# Patient Record
Sex: FEMALE | Race: BLACK OR AFRICAN AMERICAN | NOT HISPANIC OR LATINO | Employment: FULL TIME | URBAN - METROPOLITAN AREA
[De-identification: names, ages, dates, MRNs, and addresses within clinical notes are randomized per-mention and may not be internally consistent; named-entity substitution may affect disease eponyms.]

---

## 2022-10-24 ENCOUNTER — OFFICE VISIT (OUTPATIENT)
Dept: URGENT CARE | Facility: CLINIC | Age: 22
End: 2022-10-24
Payer: MEDICAID

## 2022-10-24 VITALS
HEIGHT: 64 IN | OXYGEN SATURATION: 100 % | RESPIRATION RATE: 20 BRPM | WEIGHT: 293 LBS | HEART RATE: 82 BPM | SYSTOLIC BLOOD PRESSURE: 150 MMHG | TEMPERATURE: 99 F | BODY MASS INDEX: 50.02 KG/M2 | DIASTOLIC BLOOD PRESSURE: 90 MMHG

## 2022-10-24 DIAGNOSIS — Z11.52 ENCOUNTER FOR SCREENING FOR COVID-19: Primary | ICD-10-CM

## 2022-10-24 DIAGNOSIS — J02.9 SORE THROAT: ICD-10-CM

## 2022-10-24 PROBLEM — Z98.890 S/P LAPAROSCOPIC SURGERY: Status: ACTIVE | Noted: 2020-10-03

## 2022-10-24 LAB
CTP QC/QA: YES
FLUAV AG UPPER RESP QL IA.RAPID: NOT DETECTED
FLUBV AG UPPER RESP QL IA.RAPID: NOT DETECTED
S PYO RRNA THROAT QL PROBE: NEGATIVE
SARS-COV-2 RNA RESP QL NAA+PROBE: NOT DETECTED

## 2022-10-24 PROCEDURE — 87081 CULTURE SCREEN ONLY: CPT | Performed by: NURSE PRACTITIONER

## 2022-10-24 PROCEDURE — 99204 OFFICE O/P NEW MOD 45 MIN: CPT | Mod: PBBFAC | Performed by: NURSE PRACTITIONER

## 2022-10-24 PROCEDURE — 0241U COVID/FLU A&B PCR: CPT | Performed by: NURSE PRACTITIONER

## 2022-10-24 PROCEDURE — 99213 PR OFFICE/OUTPT VISIT, EST, LEVL III, 20-29 MIN: ICD-10-PCS | Mod: S$PBB,,, | Performed by: NURSE PRACTITIONER

## 2022-10-24 PROCEDURE — 87880 STREP A ASSAY W/OPTIC: CPT | Mod: PBBFAC | Performed by: NURSE PRACTITIONER

## 2022-10-24 PROCEDURE — 99213 OFFICE O/P EST LOW 20 MIN: CPT | Mod: S$PBB,,, | Performed by: NURSE PRACTITIONER

## 2022-10-24 RX ORDER — DESOGESTREL/ETHINYL ESTRADIOL AND ETHINYL ESTRADIOL 21-5 (28)
1 KIT ORAL DAILY
COMMUNITY
Start: 2022-10-15

## 2022-10-24 NOTE — PATIENT INSTRUCTIONS
Awaiting strep reflex culture result. Hold abx for today.  Tylenol/Motrin/Chloraseptic.  Humidifier at bedside.  Flonase once daily for 14 days.  Increase fluids.  Ice/Popsicles.

## 2022-10-24 NOTE — LETTER
October 24, 2022      [facility]  [address] [address]  Phone: [phone]       Patient: Taniya Cowan   YOB: 2000  Date of Visit: 10/24/2022    To Whom It May Concern:    Luli Cowan  was at [facility] on 10/24/2022. The patient may return to work/school on 10/26/2022 with no restrictions. If you have any questions or concerns, or if I can be of further assistance, please do not hesitate to contact me.    Sincerely,    Mckay Escudero NP

## 2022-10-24 NOTE — PROGRESS NOTES
"Subjective:       Patient ID: Taniya Cowan is a 22 y.o. female.    Vitals:  height is 5' 4" (1.626 m) and weight is 148.1 kg (326 lb 6.4 oz) (abnormal). Her oral temperature is 98.5 °F (36.9 °C). Her blood pressure is 150/90 (abnormal) and her pulse is 82. Her respiration is 20 and oxygen saturation is 100%.     Chief Complaint: Sore Throat (Starting this morning)    HPI as stated in CC. Works at a . States children have flu and rsv currently.  ROS    Objective:      Physical Exam   Constitutional: She is oriented to person, place, and time.  Non-toxic appearance. She does not appear ill. No distress. obesity  HENT:   Mouth/Throat: Posterior oropharyngeal erythema present. No oropharyngeal exudate.   Pulmonary/Chest: Effort normal and breath sounds normal.   Abdominal: Normal appearance.   Musculoskeletal:      Cervical back: She exhibits tenderness.   Lymphadenopathy:     She has cervical adenopathy.   Neurological: She is alert and oriented to person, place, and time.   Skin: Skin is warm, dry and not diaphoretic.   Psychiatric: Her behavior is normal. Mood, judgment and thought content normal.   Vitals reviewed.      Assessment:       1. Encounter for screening for COVID-19    2. Sore throat          Results for orders placed or performed in visit on 10/24/22   POCT rapid strep A   Result Value Ref Range    Rapid Strep A Screen Negative Negative     Acceptable Yes          Plan:         Encounter for screening for COVID-19  -     COVID/FLU A&B PCR  -     COVID/RSV/FLU A&B PCR    Sore throat  -     POCT rapid strep A  -     Strep Only Culture  -     COVID/RSV/FLU A&B PCR     Awaiting strep reflex culture result. Hold abx for today.  Tylenol/Motrin/Chloraseptic.  Humidifier at bedside.  Flonase once daily for 14 days.  Increase fluids.  Ice/Popsicles.              "

## 2022-10-26 LAB — BACTERIA THROAT CULT: NORMAL

## 2025-04-14 ENCOUNTER — OFFICE VISIT (OUTPATIENT)
Dept: URGENT CARE | Facility: CLINIC | Age: 25
End: 2025-04-14
Payer: MEDICAID

## 2025-04-14 VITALS
BODY MASS INDEX: 48.82 KG/M2 | WEIGHT: 293 LBS | HEART RATE: 84 BPM | OXYGEN SATURATION: 100 % | DIASTOLIC BLOOD PRESSURE: 85 MMHG | RESPIRATION RATE: 18 BRPM | HEIGHT: 65 IN | SYSTOLIC BLOOD PRESSURE: 139 MMHG | TEMPERATURE: 99 F

## 2025-04-14 DIAGNOSIS — N89.8 VAGINAL ITCHING: Primary | ICD-10-CM

## 2025-04-14 LAB
CLUE CELLS VAG QL WET PREP: NORMAL
T VAGINALIS VAG QL WET PREP: NORMAL
WBC #/AREA VAG WET PREP: NORMAL
YEAST SPEC QL WET PREP: NORMAL

## 2025-04-14 PROCEDURE — 99214 OFFICE O/P EST MOD 30 MIN: CPT | Mod: PBBFAC

## 2025-04-14 PROCEDURE — 87591 N.GONORRHOEAE DNA AMP PROB: CPT

## 2025-04-14 PROCEDURE — 99213 OFFICE O/P EST LOW 20 MIN: CPT | Mod: S$PBB,,,

## 2025-04-14 PROCEDURE — 87210 SMEAR WET MOUNT SALINE/INK: CPT

## 2025-04-14 NOTE — PATIENT INSTRUCTIONS
Will notify patient of any positive results on testing and treat accordingly.  Patient can follow all test results on the patient portal.     Please practice safe sex and read patient education material.  Avoid sexual activity until all results are known. Follow up with PCP or return to urgent care if symptoms are not improving in several days. Go to the ER if symptoms worsen or new symptoms develop.

## 2025-04-14 NOTE — PROGRESS NOTES
"Subjective:       Patient ID: Taniya Cowan is a 24 y.o. female.    Vitals:  height is 5' 5" (1.651 m) and weight is 149.7 kg (330 lb) (abnormal). Her temperature is 98.7 °F (37.1 °C). Her blood pressure is 139/85 and her pulse is 84. Her respiration is 18 and oxygen saturation is 100%.     Chief Complaint: Vaginal Itching (X 3days)    24-year-old female reports to the clinic with complaints of vaginal itching and vaginal irritation that began 3 days ago.  Patient denies any vaginal discharge, vaginal odor, genital warts, genital lesions, genital rash.  Patient states she does have unprotected sex but is currently on birth control.  Patient denies fever, nausea vomiting, abdominal pain, flank, suprapubic pain    All other systems are negative    Chart reviewed    Objective:   Physical Exam   Constitutional: She appears well-developed.  Non-toxic appearance. She does not appear ill. No distress.   Neck: Neck supple.   Cardiovascular: Regular rhythm.   Pulmonary/Chest: Effort normal and breath sounds normal.   Abdominal: Normal appearance. She exhibits no distension. Soft. flat abdomen There is no abdominal tenderness. There is no rebound, no guarding, no left CVA tenderness and no right CVA tenderness.   Musculoskeletal: Normal range of motion.         General: Normal range of motion.   Neurological: no focal deficit. She is alert.   Skin: Skin is warm, dry and not diaphoretic.   Psychiatric: Her behavior is normal. Mood normal.   Nursing note and vitals reviewed.      Assessment:     1. Vaginal itching            Plan:       Will notify patient of any positive results on testing and treat accordingly.  Patient can follow all test results on the patient portal.     Please practice safe sex and read patient education material.  Avoid sexual activity until all results are known. Follow up with PCP or return to urgent care if symptoms are not improving in several days. Go to the ER if symptoms worsen or new symptoms " develop.     Vaginal itching  -     Wet Prep, Genital  -     Sexually-Transmitted Infections (STIs) Increased Risk Panel        Please note: This chart was completed via voice to text dictation. It may contain typographical/word recognition errors. If there are any questions, please contact the provider for final clarification.

## 2025-04-15 LAB
C TRACH RRNA UR QL NAA+PROBE: NOT DETECTED
N GONORRHOEA DNA UR QL NAA+PROBE: NOT DETECTED
T VAGINALIS RRNA UR QL NAA+PROBE: NOT DETECTED

## 2025-05-28 ENCOUNTER — HOSPITAL ENCOUNTER (EMERGENCY)
Facility: HOSPITAL | Age: 25
Discharge: HOME | End: 2025-05-28
Attending: EMERGENCY MEDICINE
Payer: MEDICAID

## 2025-05-28 VITALS
BODY MASS INDEX: 48.82 KG/M2 | HEIGHT: 65 IN | TEMPERATURE: 97 F | WEIGHT: 293 LBS | SYSTOLIC BLOOD PRESSURE: 128 MMHG | OXYGEN SATURATION: 96 % | DIASTOLIC BLOOD PRESSURE: 93 MMHG | HEART RATE: 64 BPM | RESPIRATION RATE: 20 BRPM

## 2025-05-28 DIAGNOSIS — K52.9 GASTROENTERITIS: Primary | ICD-10-CM

## 2025-05-28 LAB
ALBUMIN SERPL-MCNC: 3.6 G/DL (ref 3.5–5)
ALBUMIN/GLOB SERPL: 0.8 RATIO (ref 1.1–2)
ALP SERPL-CCNC: 48 UNIT/L (ref 40–150)
ALT SERPL-CCNC: 9 UNIT/L (ref 0–55)
ANION GAP SERPL CALC-SCNC: 6 MEQ/L
AST SERPL-CCNC: 7 UNIT/L (ref 11–45)
B-HCG UR QL: NEGATIVE
BACTERIA #/AREA URNS AUTO: ABNORMAL /HPF
BASOPHILS # BLD AUTO: 0.03 X10(3)/MCL
BASOPHILS NFR BLD AUTO: 0.3 %
BILIRUB SERPL-MCNC: 0.3 MG/DL
BILIRUB UR QL STRIP.AUTO: NEGATIVE
BUN SERPL-MCNC: 10.8 MG/DL (ref 7–18.7)
CALCIUM SERPL-MCNC: 9.5 MG/DL (ref 8.4–10.2)
CHLORIDE SERPL-SCNC: 108 MMOL/L (ref 98–107)
CLARITY UR: CLEAR
CO2 SERPL-SCNC: 25 MMOL/L (ref 22–29)
COLOR UR AUTO: ABNORMAL
CREAT SERPL-MCNC: 0.73 MG/DL (ref 0.55–1.02)
CREAT/UREA NIT SERPL: 15
CTP QC/QA: YES
EOSINOPHIL # BLD AUTO: 0.13 X10(3)/MCL (ref 0–0.9)
EOSINOPHIL NFR BLD AUTO: 1.5 %
ERYTHROCYTE [DISTWIDTH] IN BLOOD BY AUTOMATED COUNT: 11.9 % (ref 11.5–17)
GFR SERPLBLD CREATININE-BSD FMLA CKD-EPI: >60 ML/MIN/1.73/M2
GLOBULIN SER-MCNC: 4.3 GM/DL (ref 2.4–3.5)
GLUCOSE SERPL-MCNC: 102 MG/DL (ref 74–100)
GLUCOSE UR QL STRIP: NORMAL
HCT VFR BLD AUTO: 37.5 % (ref 37–47)
HGB BLD-MCNC: 12.4 G/DL (ref 12–16)
HGB UR QL STRIP: NEGATIVE
HOLD SPECIMEN: NORMAL
HYALINE CASTS #/AREA URNS LPF: ABNORMAL /LPF
IMM GRANULOCYTES # BLD AUTO: 0.01 X10(3)/MCL (ref 0–0.04)
IMM GRANULOCYTES NFR BLD AUTO: 0.1 %
KETONES UR QL STRIP: NEGATIVE
LEUKOCYTE ESTERASE UR QL STRIP: NEGATIVE
LIPASE SERPL-CCNC: 12 U/L
LYMPHOCYTES # BLD AUTO: 2.46 X10(3)/MCL (ref 0.6–4.6)
LYMPHOCYTES NFR BLD AUTO: 28.1 %
MCH RBC QN AUTO: 28.7 PG (ref 27–31)
MCHC RBC AUTO-ENTMCNC: 33.1 G/DL (ref 33–36)
MCV RBC AUTO: 86.8 FL (ref 80–94)
MONOCYTES # BLD AUTO: 0.38 X10(3)/MCL (ref 0.1–1.3)
MONOCYTES NFR BLD AUTO: 4.3 %
MUCOUS THREADS URNS QL MICRO: ABNORMAL /LPF
NEUTROPHILS # BLD AUTO: 5.76 X10(3)/MCL (ref 2.1–9.2)
NEUTROPHILS NFR BLD AUTO: 65.7 %
NITRITE UR QL STRIP: NEGATIVE
NRBC BLD AUTO-RTO: 0 %
PH UR STRIP: 6.5 [PH]
PLATELET # BLD AUTO: 283 X10(3)/MCL (ref 130–400)
PMV BLD AUTO: 10.4 FL (ref 7.4–10.4)
POTASSIUM SERPL-SCNC: 4 MMOL/L (ref 3.5–5.1)
PROT SERPL-MCNC: 7.9 GM/DL (ref 6.4–8.3)
PROT UR QL STRIP: NEGATIVE
RBC # BLD AUTO: 4.32 X10(6)/MCL (ref 4.2–5.4)
RBC #/AREA URNS AUTO: ABNORMAL /HPF
SODIUM SERPL-SCNC: 139 MMOL/L (ref 136–145)
SP GR UR STRIP.AUTO: 1.02 (ref 1–1.03)
SQUAMOUS #/AREA URNS LPF: ABNORMAL /HPF
UROBILINOGEN UR STRIP-ACNC: NORMAL
WBC # BLD AUTO: 8.77 X10(3)/MCL (ref 4.5–11.5)
WBC #/AREA URNS AUTO: ABNORMAL /HPF

## 2025-05-28 PROCEDURE — 81025 URINE PREGNANCY TEST: CPT | Performed by: PHYSICIAN ASSISTANT

## 2025-05-28 PROCEDURE — 83690 ASSAY OF LIPASE: CPT | Performed by: PHYSICIAN ASSISTANT

## 2025-05-28 PROCEDURE — 99284 EMERGENCY DEPT VISIT MOD MDM: CPT | Mod: 25

## 2025-05-28 PROCEDURE — 85025 COMPLETE CBC W/AUTO DIFF WBC: CPT | Performed by: PHYSICIAN ASSISTANT

## 2025-05-28 PROCEDURE — 25000003 PHARM REV CODE 250: Performed by: PHYSICIAN ASSISTANT

## 2025-05-28 PROCEDURE — 80053 COMPREHEN METABOLIC PANEL: CPT | Performed by: PHYSICIAN ASSISTANT

## 2025-05-28 PROCEDURE — 81001 URINALYSIS AUTO W/SCOPE: CPT | Performed by: PHYSICIAN ASSISTANT

## 2025-05-28 RX ORDER — LIDOCAINE HYDROCHLORIDE 20 MG/ML
15 SOLUTION OROPHARYNGEAL ONCE
Status: COMPLETED | OUTPATIENT
Start: 2025-05-28 | End: 2025-05-28

## 2025-05-28 RX ORDER — ALUMINUM HYDROXIDE, MAGNESIUM HYDROXIDE, AND SIMETHICONE 1200; 120; 1200 MG/30ML; MG/30ML; MG/30ML
30 SUSPENSION ORAL ONCE
Status: COMPLETED | OUTPATIENT
Start: 2025-05-28 | End: 2025-05-28

## 2025-05-28 RX ORDER — ONDANSETRON 4 MG/1
4 TABLET, ORALLY DISINTEGRATING ORAL
Status: COMPLETED | OUTPATIENT
Start: 2025-05-28 | End: 2025-05-28

## 2025-05-28 RX ORDER — PROMETHAZINE HYDROCHLORIDE 25 MG/1
25 TABLET ORAL EVERY 6 HOURS PRN
Qty: 15 TABLET | Refills: 0 | Status: SHIPPED | OUTPATIENT
Start: 2025-05-28

## 2025-05-28 RX ADMIN — LIDOCAINE HYDROCHLORIDE 15 ML: 20 SOLUTION ORAL at 10:05

## 2025-05-28 RX ADMIN — ALUMINUM HYDROXIDE, MAGNESIUM HYDROXIDE, AND SIMETHICONE 30 ML: 200; 200; 20 SUSPENSION ORAL at 10:05

## 2025-05-28 RX ADMIN — ONDANSETRON 4 MG: 4 TABLET, ORALLY DISINTEGRATING ORAL at 09:05

## 2025-05-28 NOTE — Clinical Note
"Le'Era "Le'Era" Camryn was seen and treated in our emergency department on 5/28/2025.  She may return to work on 05/30/2025.       If you have any questions or concerns, please don't hesitate to call.      Edith Newby PA-C"

## 2025-05-28 NOTE — ED PROVIDER NOTES
Encounter Date: 5/28/2025       History     Chief Complaint   Patient presents with    Abdominal Pain     Reports abdominal pain radiating to back with some nausea that began early this morning. Denies urinary and bowel issues. Pt. Ambulatory to triage.      Taniya Cowan is a 24 y.o. female with a PMHx of a benign heart murmur who presents to the ED with complaints of nausea, upper abdominal pain, and back pain that started this morning. She denies known sick contacts. She reports 1 episode of diarrhea. Denies dysuria, hematuria, fever, chills.       The history is provided by the patient. No  was used.     Review of patient's allergies indicates:   Allergen Reactions    Grass pollen-june grass standard      Past Medical History:   Diagnosis Date    Heart murmur     Morbid obesity      Past Surgical History:   Procedure Laterality Date    BUNIONECTOMY Left     FALLOPIAN TUBOPLASTY Left      Family History   Problem Relation Name Age of Onset    No Known Problems Mother      No Known Problems Father       Social History[1]  Review of Systems   Constitutional:  Negative for chills, fatigue and fever.   HENT:  Negative for congestion, ear pain, sinus pain and sore throat.    Eyes:  Negative for pain.   Respiratory:  Negative for cough, chest tightness and shortness of breath.    Cardiovascular:  Negative for chest pain.   Gastrointestinal:  Positive for abdominal pain and nausea. Negative for constipation, diarrhea and vomiting.   Genitourinary:  Negative for dysuria, flank pain and hematuria.   Musculoskeletal:  Negative for back pain and joint swelling.   Skin:  Negative for color change and rash.   Neurological:  Negative for dizziness, weakness and headaches.   Psychiatric/Behavioral:  Negative for behavioral problems and confusion.        Physical Exam     Initial Vitals [05/28/25 0900]   BP Pulse Resp Temp SpO2   115/89 62 20 97 °F (36.1 °C) 99 %      MAP       --         Physical  Exam    Nursing note and vitals reviewed.  Constitutional: She appears well-developed and well-nourished.   HENT:   Head: Normocephalic and atraumatic.   Nose: Nose normal.   Eyes: EOM are normal. Pupils are equal, round, and reactive to light.   Neck: Neck supple. No thyromegaly present. No JVD present.   Normal range of motion.  Cardiovascular:  Normal rate, regular rhythm, normal heart sounds and intact distal pulses.           No murmur heard.  Pulmonary/Chest: Breath sounds normal. No respiratory distress. She has no wheezes. She has no rhonchi. She has no rales. She exhibits no tenderness.   Abdominal: Abdomen is soft. Bowel sounds are normal. She exhibits no distension. There is no abdominal tenderness. There is no rebound and no guarding.   Musculoskeletal:         General: No tenderness or edema. Normal range of motion.      Cervical back: Normal range of motion and neck supple.     Lymphadenopathy:     She has no cervical adenopathy.   Neurological: She is alert and oriented to person, place, and time.   Skin: Skin is warm and dry. Capillary refill takes less than 2 seconds.   Psychiatric: She has a normal mood and affect. Thought content normal.         ED Course   Procedures  Labs Reviewed   URINALYSIS, REFLEX TO URINE CULTURE - Abnormal       Result Value    Color, UA Light-Yellow      Appearance, UA Clear      Specific Gravity, UA 1.025      pH, UA 6.5      Protein, UA Negative      Glucose, UA Normal      Ketones, UA Negative      Blood, UA Negative      Bilirubin, UA Negative      Urobilinogen, UA Normal      Nitrites, UA Negative      Leukocyte Esterase, UA Negative      RBC, UA 0-5      WBC, UA 0-5      Bacteria, UA None Seen      Squamous Epithelial Cells, UA Trace (*)     Mucous, UA Trace (*)     Hyaline Casts, UA None Seen     COMPREHENSIVE METABOLIC PANEL - Abnormal    Sodium 139      Potassium 4.0      Chloride 108 (*)     CO2 25      Glucose 102 (*)     Blood Urea Nitrogen 10.8       Creatinine 0.73      Calcium 9.5      Protein Total 7.9      Albumin 3.6      Globulin 4.3 (*)     Albumin/Globulin Ratio 0.8 (*)     Bilirubin Total 0.3      ALP 48      ALT 9      AST 7 (*)     eGFR >60      Anion Gap 6.0      BUN/Creatinine Ratio 15     LIPASE - Normal    Lipase Level 12     CBC W/ AUTO DIFFERENTIAL    Narrative:     The following orders were created for panel order CBC auto differential.  Procedure                               Abnormality         Status                     ---------                               -----------         ------                     CBC with Differential[[id number]]                           Final result                 Please view results for these tests on the individual orders.   CBC WITH DIFFERENTIAL    WBC 8.77      RBC 4.32      Hgb 12.4      Hct 37.5      MCV 86.8      MCH 28.7      MCHC 33.1      RDW 11.9      Platelet 283      MPV 10.4      Neut % 65.7      Lymph % 28.1      Mono % 4.3      Eos % 1.5      Basophil % 0.3      Imm Grans % 0.1      Neut # 5.76      Lymph # 2.46      Mono # 0.38      Eos # 0.13      Baso # 0.03      Imm Gran # 0.01      NRBC% 0.0     EXTRA TUBES    Narrative:     The following orders were created for panel order EXTRA TUBES.  Procedure                               Abnormality         Status                     ---------                               -----------         ------                     Light Blue Top Hold[[id number]]                             Final result               Gold Top Hold[[id number]]                                   Final result               Pink Top Hold[[id number]]                                   Final result                 Please view results for these tests on the individual orders.   LIGHT BLUE TOP HOLD    Extra Tube Hold for add-ons.     GOLD TOP HOLD    Extra Tube Hold for add-ons.     PINK TOP HOLD    Extra Tube Hold for add-ons.     POCT URINE PREGNANCY    POC Preg Test, Ur Negative        Acceptable Yes            Imaging Results              X-Ray Abdomen Flat And Erect (Final result)  Result time 05/28/25 10:29:29      Final result by Lion Garcia MD (05/28/25 10:29:29)                   Impression:      No acute radiographic findings.      Electronically signed by: Lion Garcia  Date:    05/28/2025  Time:    10:29               Narrative:    EXAMINATION:  XR ABDOMEN FLAT AND ERECT    CLINICAL HISTORY:  abdominal pain;    COMPARISON:  No priors    FINDINGS:  Flat and upright views of the abdomen.  There is a nonspecific, nonobstructive bowel gas pattern.  No large stool burden.  No free air.                                       Medications   ondansetron disintegrating tablet 4 mg (4 mg Oral Given 5/28/25 0943)   aluminum-magnesium hydroxide-simethicone 200-200-20 mg/5 mL suspension 30 mL (30 mLs Oral Given 5/28/25 1052)     And   LIDOcaine viscous HCl 2% oral solution 15 mL (15 mLs Oral Given 5/28/25 1052)     Medical Decision Making  DDX: gastroenteritis, acid reflux, pancreatitis, among others    Taniya Cowan is a 24 y.o. female with a PMHx of a benign heart murmur who presents to the ED with complaints of nausea, upper abdominal pain, and back pain that started this morning. She denies known sick contacts. She reports 1 episode of diarrhea. Denies dysuria, hematuria, fever, chills.     Hospital Course: CBC, CMP, UA and xray ordered. All wnl. During her stay, she began to vomit. She was given a GI cocktail and zofran. She reports improvement of symptoms. No obstruction on xray. Will DC home with Phenergan. Strict return precautions given. Stable for discharge.     Amount and/or Complexity of Data Reviewed  Labs: ordered.  Radiology: ordered.    Risk  OTC drugs.  Prescription drug management.                                      Clinical Impression:  Final diagnoses:  [K52.9] Gastroenteritis (Primary)          ED Disposition Condition    Discharge Stable          ED  Prescriptions       Medication Sig Dispense Start Date End Date Auth. Provider    promethazine (PHENERGAN) 25 MG tablet Take 1 tablet (25 mg total) by mouth every 6 (six) hours as needed for Nausea. 15 tablet 5/28/2025 -- Edith Newby PA-C          Follow-up Information       Follow up With Specialties Details Why Contact Info    Vandana Ramos FNP-C Family Medicine   [address] [id number] [phone]      [facility] Emergency Medicine In 3 days As needed, If symptoms worsen [address] [address] [phone]                   [1]   Social History  Tobacco Use    Smoking status: Never    Smokeless tobacco: Never   Substance Use Topics    Alcohol use: Never    Drug use: Never        Edith Newby PA-C  06/01/25 2052